# Patient Record
Sex: FEMALE | Race: WHITE | NOT HISPANIC OR LATINO | Employment: FULL TIME | URBAN - METROPOLITAN AREA
[De-identification: names, ages, dates, MRNs, and addresses within clinical notes are randomized per-mention and may not be internally consistent; named-entity substitution may affect disease eponyms.]

---

## 2017-02-10 ENCOUNTER — HISTORICAL (OUTPATIENT)
Dept: LAB | Facility: HOSPITAL | Age: 52
End: 2017-02-10

## 2020-01-21 ENCOUNTER — HISTORICAL (OUTPATIENT)
Dept: RADIOLOGY | Facility: HOSPITAL | Age: 55
End: 2020-01-21

## 2020-10-22 ENCOUNTER — HISTORICAL (OUTPATIENT)
Dept: ADMINISTRATIVE | Facility: HOSPITAL | Age: 55
End: 2020-10-22

## 2020-10-22 LAB
ABS NEUT (OLG): 2.5 X10(3)/MCL (ref 2.1–9.2)
ALBUMIN SERPL-MCNC: 4.9 GM/DL (ref 3.4–5)
ALBUMIN/GLOB SERPL: 2.04 {RATIO} (ref 1.5–2.5)
ALP SERPL-CCNC: 68 UNIT/L (ref 38–126)
ALT SERPL-CCNC: 12 UNIT/L (ref 7–52)
APPEARANCE, UA: CLEAR
AST SERPL-CCNC: 18 UNIT/L (ref 15–37)
BACTERIA #/AREA URNS AUTO: NORMAL /HPF
BILIRUB SERPL-MCNC: 1.3 MG/DL (ref 0.2–1)
BILIRUB UR QL STRIP: NEGATIVE MG/DL
BILIRUBIN DIRECT+TOT PNL SERPL-MCNC: 0.2 MG/DL (ref 0–0.5)
BILIRUBIN DIRECT+TOT PNL SERPL-MCNC: 1.1 MG/DL
BUN SERPL-MCNC: 11 MG/DL (ref 7–18)
CALCIUM SERPL-MCNC: 9.8 MG/DL (ref 8.5–10)
CHLORIDE SERPL-SCNC: 105 MMOL/L (ref 98–107)
CHOLEST SERPL-MCNC: 250 MG/DL (ref 0–200)
CHOLEST/HDLC SERPL: 4.2 {RATIO}
CK SERPL-CCNC: 63 UNIT/L (ref 21–232)
CO2 SERPL-SCNC: 27 MMOL/L (ref 21–32)
COLOR UR: YELLOW
CREAT SERPL-MCNC: 0.84 MG/DL (ref 0.6–1.3)
ERYTHROCYTE [DISTWIDTH] IN BLOOD BY AUTOMATED COUNT: 12.4 % (ref 11.5–17)
GLOBULIN SER-MCNC: 2.4 GM/DL (ref 1.2–3)
GLUCOSE (UA): NEGATIVE MG/DL
GLUCOSE SERPL-MCNC: 93 MG/DL (ref 74–106)
HCT VFR BLD AUTO: 43 % (ref 37–47)
HCV AB SERPL QL IA: NONREACTIVE
HDLC SERPL-MCNC: 59 MG/DL (ref 35–60)
HGB BLD-MCNC: 13.7 GM/DL (ref 12–16)
HGB UR QL STRIP: NEGATIVE UNIT/L
KETONES UR QL STRIP: NEGATIVE MG/DL
LDLC SERPL CALC-MCNC: 179 MG/DL (ref 0–129)
LEUKOCYTE ESTERASE UR QL STRIP: NEGATIVE UNIT/L
LYMPHOCYTES # BLD AUTO: 1.6 X10(3)/MCL (ref 0.6–3.4)
LYMPHOCYTES NFR BLD AUTO: 34.7 % (ref 13–40)
MCH RBC QN AUTO: 29.5 PG (ref 27–31.2)
MCHC RBC AUTO-ENTMCNC: 32 GM/DL (ref 32–36)
MCV RBC AUTO: 92 FL (ref 80–94)
MONOCYTES # BLD AUTO: 0.6 X10(3)/MCL (ref 0.1–1.3)
MONOCYTES NFR BLD AUTO: 13.1 % (ref 0.1–24)
NEUTROPHILS NFR BLD AUTO: 52.2 % (ref 47–80)
NITRITE UR QL STRIP.AUTO: NEGATIVE
PH UR STRIP: 5.5 [PH]
PLATELET # BLD AUTO: 184 X10(3)/MCL (ref 130–400)
PMV BLD AUTO: 10 FL (ref 9.4–12.4)
POTASSIUM SERPL-SCNC: 4.3 MMOL/L (ref 3.5–5.1)
PROT SERPL-MCNC: 7.3 GM/DL (ref 6.4–8.2)
PROT UR QL STRIP: NEGATIVE MG/DL
RBC # BLD AUTO: 4.65 X10(6)/MCL (ref 4.2–5.4)
RBC #/AREA URNS HPF: NORMAL /HPF
SODIUM SERPL-SCNC: 141 MMOL/L (ref 136–145)
SP GR UR STRIP: 1.01
SQUAMOUS EPITHELIAL, UA: NORMAL /LPF
TRIGL SERPL-MCNC: 117 MG/DL (ref 30–150)
TSH SERPL-ACNC: 1.19 MIU/ML (ref 0.35–4.94)
UROBILINOGEN UR STRIP-ACNC: 0.2 MG/DL
VLDLC SERPL CALC-MCNC: 23.4 MG/DL
WBC # SPEC AUTO: 4.7 X10(3)/MCL (ref 4.5–11.5)
WBC #/AREA URNS AUTO: NORMAL /[HPF]

## 2021-01-29 ENCOUNTER — HISTORICAL (OUTPATIENT)
Dept: ADMINISTRATIVE | Facility: HOSPITAL | Age: 56
End: 2021-01-29

## 2021-01-29 LAB
ALBUMIN SERPL-MCNC: 4.4 GM/DL (ref 3.4–5)
ALBUMIN/GLOB SERPL: 1.91 {RATIO} (ref 1.5–2.5)
ALP SERPL-CCNC: 60 UNIT/L (ref 38–126)
ALT SERPL-CCNC: 15 UNIT/L (ref 7–52)
AST SERPL-CCNC: 21 UNIT/L (ref 15–37)
BILIRUB SERPL-MCNC: 1 MG/DL (ref 0.2–1)
BILIRUBIN DIRECT+TOT PNL SERPL-MCNC: 0.2 MG/DL (ref 0–0.5)
BILIRUBIN DIRECT+TOT PNL SERPL-MCNC: 0.8 MG/DL
BUN SERPL-MCNC: 14 MG/DL (ref 7–18)
CALCIUM SERPL-MCNC: 9.4 MG/DL (ref 8.5–10)
CHLORIDE SERPL-SCNC: 108 MMOL/L (ref 98–107)
CHOLEST SERPL-MCNC: 141 MG/DL (ref 0–200)
CHOLEST/HDLC SERPL: 2.4 {RATIO}
CK SERPL-CCNC: 248 UNIT/L (ref 21–232)
CO2 SERPL-SCNC: 28 MMOL/L (ref 21–32)
CREAT SERPL-MCNC: 0.73 MG/DL (ref 0.6–1.3)
GLOBULIN SER-MCNC: 2.3 GM/DL (ref 1.2–3)
GLUCOSE SERPL-MCNC: 97 MG/DL (ref 74–106)
HDLC SERPL-MCNC: 58 MG/DL (ref 35–60)
LDLC SERPL CALC-MCNC: 63 MG/DL (ref 0–129)
POTASSIUM SERPL-SCNC: 4.4 MMOL/L (ref 3.5–5.1)
PROT SERPL-MCNC: 6.7 GM/DL (ref 6.4–8.2)
SODIUM SERPL-SCNC: 143 MMOL/L (ref 136–145)
TRIGL SERPL-MCNC: 68 MG/DL (ref 30–150)
VLDLC SERPL CALC-MCNC: 13.6 MG/DL

## 2021-02-26 ENCOUNTER — HISTORICAL (OUTPATIENT)
Dept: RADIOLOGY | Facility: HOSPITAL | Age: 56
End: 2021-02-26

## 2021-10-29 ENCOUNTER — HISTORICAL (OUTPATIENT)
Dept: ADMINISTRATIVE | Facility: HOSPITAL | Age: 56
End: 2021-10-29

## 2021-10-29 LAB
ABS NEUT (OLG): 1.8 X10(3)/MCL (ref 2.1–9.2)
ALBUMIN SERPL-MCNC: 4.6 GM/DL (ref 3.4–5)
ALBUMIN/GLOB SERPL: 1.92 {RATIO} (ref 1.5–2.5)
ALP SERPL-CCNC: 61 UNIT/L (ref 38–126)
ALT SERPL-CCNC: 11 UNIT/L (ref 7–52)
APPEARANCE, UA: CLEAR
AST SERPL-CCNC: 14 UNIT/L (ref 15–37)
BACTERIA #/AREA URNS AUTO: NORMAL /HPF
BILIRUB SERPL-MCNC: 1.2 MG/DL (ref 0.2–1)
BILIRUB UR QL STRIP: NEGATIVE MG/DL
BILIRUBIN DIRECT+TOT PNL SERPL-MCNC: 0.3 MG/DL (ref 0–0.5)
BILIRUBIN DIRECT+TOT PNL SERPL-MCNC: 0.9 MG/DL
BUN SERPL-MCNC: 13 MG/DL (ref 7–18)
CALCIUM SERPL-MCNC: 9.6 MG/DL (ref 8.5–10)
CHLORIDE SERPL-SCNC: 106 MMOL/L (ref 98–107)
CHOLEST SERPL-MCNC: 177 MG/DL (ref 0–200)
CHOLEST/HDLC SERPL: 3.1 {RATIO}
CK SERPL-CCNC: 73 UNIT/L (ref 21–232)
CO2 SERPL-SCNC: 30 MMOL/L (ref 21–32)
COLOR UR: YELLOW
CREAT SERPL-MCNC: 0.75 MG/DL (ref 0.6–1.3)
ERYTHROCYTE [DISTWIDTH] IN BLOOD BY AUTOMATED COUNT: 12.2 % (ref 11.5–17)
EST CREAT CLEARANCE SER (OHS): 100.94 ML/MIN
GLOBULIN SER-MCNC: 2.5 GM/DL (ref 1.2–3)
GLUCOSE (UA): NEGATIVE MG/DL
GLUCOSE SERPL-MCNC: 98 MG/DL (ref 74–106)
HCT VFR BLD AUTO: 39.6 % (ref 37–47)
HDLC SERPL-MCNC: 58 MG/DL (ref 35–60)
HGB BLD-MCNC: 12.9 GM/DL (ref 12–16)
HGB UR QL STRIP: NEGATIVE UNIT/L
KETONES UR QL STRIP: NEGATIVE MG/DL
LDLC SERPL CALC-MCNC: 83 MG/DL (ref 0–129)
LEUKOCYTE ESTERASE UR QL STRIP: NEGATIVE UNIT/L
LYMPHOCYTES # BLD AUTO: 1.3 X10(3)/MCL (ref 0.6–3.4)
LYMPHOCYTES NFR BLD AUTO: 37.6 % (ref 13–40)
MCH RBC QN AUTO: 29.9 PG (ref 27–31.2)
MCHC RBC AUTO-ENTMCNC: 33 GM/DL (ref 32–36)
MCV RBC AUTO: 92 FL (ref 80–94)
MONOCYTES # BLD AUTO: 0.4 X10(3)/MCL (ref 0.1–1.3)
MONOCYTES NFR BLD AUTO: 11.1 % (ref 0.1–24)
NEUTROPHILS NFR BLD AUTO: 51.3 % (ref 47–80)
NITRITE UR QL STRIP.AUTO: NEGATIVE
PH UR STRIP: 6 [PH]
PLATELET # BLD AUTO: 142 X10(3)/MCL (ref 130–400)
PMV BLD AUTO: 9.9 FL (ref 9.4–12.4)
POTASSIUM SERPL-SCNC: 4.6 MMOL/L (ref 3.5–5.1)
PROT SERPL-MCNC: 7 GM/DL (ref 6.4–8.2)
PROT UR QL STRIP: NEGATIVE MG/DL
RBC # BLD AUTO: 4.32 X10(6)/MCL (ref 4.2–5.4)
RBC #/AREA URNS HPF: NORMAL /HPF
SODIUM SERPL-SCNC: 143 MMOL/L (ref 136–145)
SP GR UR STRIP: 1.02
SQUAMOUS EPITHELIAL, UA: NORMAL /LPF
TRIGL SERPL-MCNC: 72 MG/DL (ref 30–150)
TSH SERPL-ACNC: 1.1 MIU/ML (ref 0.35–4.94)
UROBILINOGEN UR STRIP-ACNC: 0.2 MG/DL
VLDLC SERPL CALC-MCNC: 14.4 MG/DL
WBC # SPEC AUTO: 3.5 X10(3)/MCL (ref 4.5–11.5)
WBC #/AREA URNS AUTO: NORMAL /[HPF]

## 2022-03-18 ENCOUNTER — HISTORICAL (OUTPATIENT)
Dept: ADMINISTRATIVE | Facility: HOSPITAL | Age: 57
End: 2022-03-18

## 2022-04-08 ENCOUNTER — HISTORICAL (OUTPATIENT)
Dept: RADIOLOGY | Facility: HOSPITAL | Age: 57
End: 2022-04-08

## 2022-04-08 ENCOUNTER — HISTORICAL (OUTPATIENT)
Dept: ADMINISTRATIVE | Facility: HOSPITAL | Age: 57
End: 2022-04-08

## 2022-04-09 ENCOUNTER — HISTORICAL (OUTPATIENT)
Dept: ADMINISTRATIVE | Facility: HOSPITAL | Age: 57
End: 2022-04-09

## 2022-04-21 ENCOUNTER — HISTORICAL (OUTPATIENT)
Dept: RADIOLOGY | Facility: HOSPITAL | Age: 57
End: 2022-04-21

## 2022-04-21 ENCOUNTER — HISTORICAL (OUTPATIENT)
Dept: ADMINISTRATIVE | Facility: HOSPITAL | Age: 57
End: 2022-04-21

## 2022-04-26 VITALS
BODY MASS INDEX: 26.13 KG/M2 | HEIGHT: 68 IN | WEIGHT: 172.38 LBS | DIASTOLIC BLOOD PRESSURE: 72 MMHG | SYSTOLIC BLOOD PRESSURE: 132 MMHG

## 2022-04-27 DIAGNOSIS — R89.9 ABNORMAL LABORATORY TEST: Primary | ICD-10-CM

## 2022-05-02 NOTE — HISTORICAL [facility] CERNER
This is a historical note converted from [place]erm[place]. Formatting and pictures may have been removed.  Please reference Lucila for original formatting and attached multimedia. Chief Complaint  wellnes  History of Present Illness  The patient is a 56 year old white female,?here today for a complete Wellness physical.??The patient?has?no acute complaints today. The patient also carries a diagnosis of?HLD, which is assessed today.? Exercise is reported as moderate and includes?walking with no chest pain or shortness of breath.?? Diet modifications are reported as?attempting lean proteins and increased vegetable matter and intermittent fasting.?? Previous documented weight is recorded? as, charted today?as 187#- lost noted, congratulated her on her efforts and hard work, enc to keep it up! 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 Overall she is feeling well and denies any acute issue or concerns at this time. She is followed by her GYN for paps and mammogram s and has an apt in Dec  She does have a history of hearing loss in which she wears hearing aides  ?   Byron King-YASMINE Jimenez-GYN  [place]  Review of Systems  Constitutional:?no weight gain,?weight loss,?no fatigue,?no fever,?no chills,?no weakness,?no trouble sleeping.  Eyes:?no vision loss/changes,?glasses or contacts,?no pain,?no redness,?no blurry or double vision,?no flashing lights,?no specks,?no glaucoma,?no cataracts.  Last eye exam:?unknown  Head:?no headache,?no head injury,?no neck pain.?  Neck:??no lumps,?no swollen glands,?no stiffness.  Ears:?no decreased hearing,?no ringing,?no earache,?no drainage.?  Nose:?no stuffiness,?no discharge,?no itching,?no hay fever,?no nosebleeds,?no sinus pain.  Throat:?no bleeding,?no dentures,?no sore tongue,?no dry mouth,?no sore throat,?no hoarseness,?no thrush,?no non-healing sores.  Cardiovascular:?no chest pain or discomfort,?no tightness,?no palpitations,?no SOB with  activity,?no difficulty breathing while supine,?no swelling,?no sudden awakening from sleep with SOB.  Vascular:?no calf pain with walking,?no leg cramping.  Respiratory:??no cough,?no sputum,?no coughing up blood,?no SOB,?no wheezing,?no painful breathing.  Gastrointestinal:?no swallowing difficulty,?no heartburn,?no change in appetite,?no nausea,?no change in bowel habits,?no rectal bleeding,?no constipation,?no diarrhea,?no yellow eyes or skin.  Urinary:?no frequency,?no urgency,?no burning or pain,?no blood in urine,?no incontinence,?no change in urinary strength.  Musculoskeletal:?no muscle or joint pain,?no stiffness,?no back pain,?no redness of joints,?no swelling of joints,?no trauma.  Skin:?no rashes,?no lumps,?no itching,?no dryness,?color normal for ethnicity,?no hair or nail changes.  Neurologic:?no dizziness,?no fainting,?no seizures,?no weakness,?no numbness,?no tingling,?no tremors.  Psychiatric:?no nervousness,?no stress,?no depression,?no memory loss.  Endocrine:?no heat or cold intolerance,?no sweating,?no frequent urination,?no thirst,?no change in appetite.  Hematologic:?no ease of bruising,?no ease of bleeding.  ?  Physical Exam  Vitals & Measurements  BP:?132/72?  HT:?172.00?cm? WT:?78.200?kg? BMI:?26.43?  General- In NAD, A&O x 4  ?   Eye- PERRL, EOMI  ?   HENT- TM/EAC clear, Nose mucosa WNL, No D/C, No Sinus Tenderness, O/P without erythema or exudates?  ?   Neck- S, No LA, No Thyromegaly, No bruits, No JVD  ?   Respiratory- CTA, No wheezing, No crackles, No rhonchi  ?   Cardiovascular- RRR W/O MGR, Pulses equal throughout  ?   Gastrointestinal- S, NT, No HSM, NABS, No masses, No peritoneal signs?  ?   Lymphatics- WNL  ?  Musculoskeletal- No tenderness, Joints WNL, FROM, Neg SLR, No CCE  ?  Integumentary- Warm, dry, intact, No lesions/rashes/hives  ?  Neurologic- No Motor/Sensory deficits, Reflexes +2 throughout, CN II-XII intact, Neg cerebellar tests  ?  Assessment/Plan  1.?Wellness  examination?Z00.00  1.?Wellness  ?   -Health and Exercise Prescription issued and educated upon  ?   -10% weight loss goal  ?   -Lifestyle counseling >20minutes  ?   -Diet: Lean proteins and increased vegetable matter  ?   -Screening: UTD with GYN-Dr. Jimenez-we will acquire records; UTD?colonoscopy?with Dr. Byron King-we will acquire records  ?   -Vaccines: Flu/Shingrix/Covid UTD, Tdap and Prevnar today  ?   -Labs:?See below  ?  2.Comorbidities:?See below  ?  3. Referrals:?None  ?  4. RTC: 12-month wellness  Ordered:  Automated Diff, Routine collect, 10/29/21 9:14:00 CDT, Blood, Collected, Stop date 10/29/21 9:14:00 CDT, Lab Collect, Wellness examination, 10/29/21 9:14:00 CDT  CBC w/ Auto Diff, Routine collect, 10/29/21 9:14:00 CDT, Blood, Stop date 10/29/21 9:14:00 CDT, Lab Collect, Wellness examination, 10/29/21 9:14:00 CDT  Clinic Follow up, *Est. 10/29/22 3:00:00 CDT, Order for future visit, Wellness examination, [facility]  Clinic Follow-up PRN, 10/29/21 9:10:00 CDT, [facility] - [facility], Future Order  Comprehensive Metabolic Panel, Routine collect, 10/29/21 9:14:00 CDT, Blood, Stop date 10/29/21 9:14:00 CDT, Lab Collect, Wellness examination, 10/29/21 9:14:00 CDT  Lipid Panel, Routine collect, 10/29/21 9:14:00 CDT, Blood, Stop date 10/29/21 9:14:00 CDT, Lab Collect, Wellness examination  HLD (hyperlipidemia), 10/29/21 9:14:00 CDT  Preventative Health Care Est 40-64 years [id number] PC, Wellness examination, [facility] - [facility], 10/29/21 9:10:00 CDT  Thyroid Stimulating Hormone, Routine collect, 10/29/21 9:14:00 CDT, Blood, Stop date 10/29/21 9:14:00 CDT, Lab Collect, Wellness examination, 10/29/21 9:14:00 CDT  Urinalysis no Reflex, Routine collect, Urine, 10/29/21 9:14:00 CDT, Stop date 10/29/21 9:14:00 CDT, Nurse collect, Wellness examination  ?  2.?HLD (hyperlipidemia)?E78.5  1. Statin medication is?efficacious with no side effects  2. Continue medication dosing with no change-12-month prescription given  3.  Low-cholesterol diet?given and educated upon  4. FLP?[id number]-yc  Ordered:  Creatine Kinase, Routine collect, 10/29/21 9:14:00 CDT, Blood, Stop date 10/29/21 9:14:00 CDT, Lab Collect, HLD (hyperlipidemia), 10/29/21 9:14:00 CDT  Lipid Panel, Routine collect, 10/29/21 9:14:00 CDT, Blood, Stop date 10/29/21 9:14:00 CDT, Lab Collect, Wellness examination  HLD (hyperlipidemia), 10/29/21 9:14:00 CDT  ?  3.?Need for vaccination?Z23  1. Tdap and Prevnar today  ?  4.?Hearing loss?H91.90  1. Currently controlled with hearing aides  ?  Orders:  rosuvastatin, 20 mg = 1 tab(s), Oral, Daily, d/c pravastatin, # 90 tab(s), 3 Refill(s), Pharmacy: [facility], 172, cm, Height/Length Dosing, 10/29/21 8:35:00 CDT, 78.2, kg, Weight Dosing, 10/29/21 8:35:00 CDT  Referrals  Clinic Follow up, *Est. 10/29/22 3:00:00 CDT, Order for future visit, Wellness examination, [facility]  Clinic Follow-up PRN, 10/29/21 9:10:00 CDT, [facility]LINK AMB - [facility], Future Order   Problem List/Past Medical History  Ongoing  Hearing loss  Historical  No qualifying data  Procedure/Surgical History  Colonoscopy   Medications  Fish Oil 1200 mg oral capsule, 1200 mg= 1 cap(s), Oral, TID  rosuvastatin 20 mg oral tablet, 20 mg= 1 tab(s), Oral, Daily, 3 refills  Vitamin D 1000 intl units oral tablet  Xyzal 5 mg oral tablet, 5 mg= 1 tab(s), Oral, qPM  Allergies  No Known Allergies  Family History  Alzheimers disease: Mother.  Atrial fibrillation: Mother.  CAD - Coronary artery disease: Mother.  COPD (chronic obstructive pulmonary disease).: Father.  Hypertension.: Mother.  Immunizations  Vaccine Date Status   pneumococcal 13-valent conjugate vaccine 10/29/2021 Given   tetanus/diphtheria/pertussis, acel(Tdap) 10/29/2021 Given   influenza virus vaccine, inactivated 09/20/2021 Recorded   zoster vaccine, inactivated 01/29/2021 Given   COVID-19 MRNA, LNP-S, PF- Pfizer 01/12/2021 Recorded   COVID-19 MRNA, LNP-S, PF- Pfizer 12/22/2020 Recorded   zoster vaccine, inactivated  10/22/2020 Given   influenza virus vaccine, inactivated 09/25/2019 Recorded   influenza virus vaccine, inactivated 09/21/2018 Recorded   tetanus-diphth toxoids (Td) adult/adol 2011 Recorded   measles/mumps/rubella virus vaccine 07/02/2002 Recorded   rubella virus vaccine 07/26/1992 Recorded   smallpox vaccine 04/23/1971 Recorded   measles virus vaccine 03/02/1966 Recorded   diphtheria/pertussis, acel/tetanus ped 04/08/1964 Recorded   smallpox vaccine 03/18/1964 Recorded   diphtheria/pertussis, acel/tetanus ped 03/04/1964 Recorded   diphtheria/pertussis, acel/tetanus ped 02/05/1964 Recorded   Health Maintenance  Health Maintenance  ???Pending?(in the next year)  ??? ??Due?  ??? ? ? ?ADL Screening due??10/29/21??and every 1??year(s)  ??? ??Refused?  ??? ? ? ?Aspirin Therapy for CVD Prevention due??10/29/21??and every 1??year(s)  ??? ??Due In Future?  ??? ? ? ?Obesity Screening not due until??01/01/22??and every 1??year(s)  ??? ? ? ?Alcohol Misuse Screening not due until??01/02/22??and every 1??year(s)  ???Satisfied?(in the past 1 year)  ??? ??Satisfied?  ??? ? ? ?Alcohol Misuse Screening on??10/29/21.??Satisfied by Martha Yusuf NP  ??? ? ? ?Blood Pressure Screening on??10/29/21.??Satisfied by Maryann Ivy MA  ??? ? ? ?Body Mass Index Check on??10/29/21.??Satisfied by Maryann Ivy MA  ??? ? ? ?Breast Cancer Screening on??02/26/21.??Satisfied by Bismark Reid  ??? ? ? ?Depression Screening on??10/29/21.??Satisfied by Anni Arthur NP Martha  ??? ? ? ?Diabetes Screening on??01/29/21.??Satisfied by Bernard Huang  ??? ? ? ?Influenza Vaccine on??09/20/21.??Satisfied by Maryann Ivy MA  ??? ? ? ?Lipid Screening on??01/29/21.??Satisfied by Bernard Huang  ??? ? ? ?Obesity Screening on??10/29/21.??Satisfied by Maryann Ivy MA  ??? ? ? ?Tetanus Vaccine on??10/29/21.??Satisfied by Maryann Ivy MA  ??? ??Refused?  ??? ? ? ?Aspirin Therapy for CVD Prevention on??10/29/21.??Recorded by Anni Arthur  Martha BLANDON??Reason: Patient Refuses  ?      The?physician?is present within?the office with the?nurse practitioner.??The?office visit?documentation and management have been?reviewed and agreed upon.? I will continue to follow?this patient along with?the nurse practitioner?for current and future care.

## 2022-05-02 NOTE — HISTORICAL [facility] CERNER
This is a historical note converted from Lucila. Formatting and pictures may have been removed.  Please reference Lucila for original formatting and attached multimedia. Chief Complaint  cpx  History of Present Illness  The patient is a 55 year old white female,?past patient of this clinic?but not seen since 2017?due to some insurance changes, here today for a complete Wellness physical.? She lost her? to cancer?in her mother this last year. ?She is still in the grieving process.??She feels that she has a good support group and plans on?setting of group?support with hospice [facility] once it is available again. ?The patient?has?no acute complaints today. The patient also carries a diagnosis of?HLD, which is assessed today.? Exercise is reported as moderate and includes?walking with no chest pain or shortness of breath.?? Diet modifications are reported as?attempting lean proteins and increased vegetable matter and intermittent fasting.?? Previous documented weight is recorded? as, charted today?as 187?pounds.  ?   Byron King-GI  Sathish Barbara-GYN  Review of Systems  Constitutional:?no weight gain,?no weight loss,?no fatigue,?no fever,?no chills,?no weakness,?no trouble sleeping.  Eyes:?no vision loss/changes,?no glasses or contacts,?no pain,?no redness,?no blurry or double vision,?no flashing lights,?no specks,?no glaucoma,?no cataracts.  Last eye exam:?within last 6 months  Head:?no headache,?no head injury,?no neck pain.?  Neck:??no lumps,?no swollen glands,?no stiffness.  Ears:?no decreased hearing,?no ringing,?no earache,?no drainage.?  Nose:?no stuffiness,?no discharge,?no itching,?no hay fever,?no nosebleeds,?no sinus pain.  Throat:?no bleeding,?no dentures,?no sore tongue,?no dry mouth,?no sore throat,?no hoarseness,?no thrush,?no non-healing sores.  Cardiovascular:?no chest pain or discomfort,?no tightness,?no palpitations,?no SOB with activity,?no difficulty breathing while supine,?no  swelling,?no sudden awakening from sleep with SOB.  Vascular:?no calf pain with walking,?no leg cramping.  Respiratory:??no cough,?no sputum,?no coughing up blood,?no SOB,?no wheezing,?no painful breathing.  Gastrointestinal:?no swallowing difficulty,?no heartburn,?no change in appetite,?no nausea,?no change in bowel habits,?no rectal bleeding,?no constipation,?no diarrhea,?no yellow eyes or skin.  Urinary:?no frequency,?no urgency,?no burning or pain,?no blood in urine,?no incontinence,?no change in urinary strength.  Musculoskeletal:?no muscle or joint pain,?no stiffness,?no back pain,?no redness of joints,?no swelling of joints,?no trauma.  Skin:?no rashes,?no lumps,?no itching,?no dryness,?color normal for ethnicity,?no hair or nail changes.  Neurologic:?no dizziness,?no fainting,?no seizures,?no weakness,?no numbness,?no tingling,?no tremors.  Psychiatric:?no nervousness,?no stress,?no depression,?no memory loss.  Endocrine:?no heat or cold intolerance,?no sweating,?no frequent urination,?no thirst,?no change in appetite.  Hematologic:?no ease of bruising,?no ease of bleeding.  ?  ?  ?  ?  Physical Exam  Vitals & Measurements  BP:?128/84?  HT:?172.00?cm? WT:?85.100?kg? BMI:?28.77?  VITAL SIGNS:? Reviewed.? ?  GENERAL:? In?no apparent distress.? Alert and Oriented x3  HEAD:?No signsof head trauma. Normocephalic  EYES:? Pupils?equal/round/reactive.? Extraocular motionsintact.  EARS:? Hearing?grossly intact. TMs and EAC?clear  MOUTH:??Oropharynx is clear.?No erythema. No exudates  NECK:? No LAD. No JVD. No thyromegaly. No bruits  CHEST:? Chest with clear breath sounds bilaterally.? No wheezes, rales, or rhonchi. Good air movement  CARDIAC:? Regular rate and rhythm.? S1 and S2, without murmurs, gallops, or rubs.  VASCULAR:??No Edema.? Peripheral pulses normal and equal in all extremities.  ABDOMEN:?Soft, without detectable tenderness.??No sign of distention.?No rebound or guarding, and no masses palpated.? ?Bowel  Sounds present and normal x 4.  MUSCULOSKELETAL:??Good range of motion of all major joints.?5/5 strength throughout. Extremities without clubbing, cyanosis or edema.  NEUROLOGIC EXAM:? Alert and oriented x 3.? No focal sensory or strength deficits.? ?Speech normal.? Follows commands.  PSYCHIATRIC:? Mood normal.  SKIN:??No rash or lesions.  Assessment/Plan  1.?Wellness examination?Z00.00  ?Assessment/Plan:  ?   1.?Wellness  ?   -Health and Exercise Prescription issued and educated upon  ?   -10% weight loss goal  ?   -Lifestyle counseling >20minutes  ?   -Diet: Lean proteins and increased vegetable matter  ?   -Screening: UTD with GYN-Dr. Jimenez-we will acquire records; UTD?colonoscopy?with Dr. Byron King-we will acquire records  ?   -Vaccines: Shingrix No. 1 today;?otherwise UTD  ?   -Labs:?See below  ?   2.Comorbidities:?See below  ?  3. Referrals:?None  ?  4. RTC: 12-month wellness  ?  Ordered:  CBC w/ Auto Diff, Routine collect, 10/22/20 15:37:00 CDT, Blood, Order for future visit, Stop date 10/22/20 15:37:00 CDT, Lab Collect, Wellness examination, 10/22/20 15:37:00 CDT  Clinic Follow up, *Est. 10/22/21 3:00:00 CDT, Wellness, Order for future visit, Wellness examination, [facility]  Comprehensive Metabolic Panel, Routine collect, 10/22/20 15:37:00 CDT, Blood, Order for future visit, Stop date 10/22/20 15:37:00 CDT, Lab Collect, Wellness examination, 10/22/20 15:37:00 CDT  Lab Collection Request, 10/22/20 15:37:00 CDT, [facility]LINK AMB - [facility], 10/22/20 15:37:00 CDT, Wellness examination  Lipid Panel, Routine collect, *Est. 10/22/20 3:00:00 CDT, Blood, Order for future visit, *Est. Stop date 10/22/20 3:00:00 CDT, Lab Collect, Wellness examination, 10/22/20 15:37:00 CDT  Thyroid Stimulating Hormone, Routine collect, 10/22/20 15:37:00 CDT, Blood, Order for future visit, Stop date 10/22/20 15:37:00 CDT, Lab Collect, Wellness examination, 10/22/20 15:37:00 CDT  Urinalysis no Reflex, Routine collect, Urine, Order for  future visit, 10/22/20 15:37:00 CDT, Stop date 10/22/20 15:37:00 CDT, Nurse collect, Wellness examination  ?  2.?HLD (hyperlipidemia)?E78.5  ??-Statin medication is?efficacious with no side effects  -Continue medication dosing with no change-12-month prescription given  -Low-cholesterol diet?given and educated upon  -FLP?syiqi-deimfg-cb  Ordered:  Creatine Kinase, Routine collect, 10/22/20 15:37:00 CDT, Blood, Order for future visit, Stop date 10/22/20 15:37:00 CDT, Lab Collect, HLD (hyperlipidemia), 10/22/20 15:37:00 CDT  ?  Orders:  pravastatin, See Instructions, TAKE 1 TABLET BY MOUTH EVERYDAY AT BEDTIME, # 90 tab(s), 3 Refill(s), Pharmacy: [facility], 172, cm, Height/Length Dosing, 10/22/20 14:59:00 CDT, 85.1, kg, Weight Dosing, 10/22/20 14:59:00 CDT  zoster vaccine, inactivated, 0.5 mL, IM, Once-NOW, first dose 10/22/20 15:37:00 CDT, stop date 10/22/20 15:37:00 CDT  Hepatitis C Antibody, Routine collect, 10/22/20 15:37:00 CDT, Blood, Order for future visit, Stop date 10/22/20 15:37:00 CDT, Lab Collect, Encounter for hepatitis C screening test for low risk patient, 10/22/20 15:37:00 CDT  Referrals  Clinic Follow up, *Est. 10/22/21 3:00:00 CDT, Wellness, Order for future visit, Wellness examination, [facility]   Problem List/Past Medical History  Ongoing  No qualifying data  Historical  No qualifying data  Medications  pravastatin 40 mg oral tablet, See Instructions, 3 refills  Allergies  No Known Allergies  Immunizations  Vaccine Date Status   zoster vaccine, inactivated 10/22/2020 Given   influenza virus vaccine, inactivated 09/25/2019 Recorded   influenza virus vaccine, inactivated 09/21/2018 Recorded   tetanus-diphth toxoids (Td) adult/adol 2011 Recorded   measles/mumps/rubella virus vaccine 07/02/2002 Recorded   smallpox vaccine 04/23/1971 Recorded   diphtheria/pertussis, acel/tetanus ped 04/08/1964 Recorded   smallpox vaccine 03/18/1964 Recorded   diphtheria/pertussis, acel/tetanus ped 03/04/1964  Recorded   diphtheria/pertussis, acel/tetanus ped 02/05/1964 Recorded   Health Maintenance  Health Maintenance  ???Pending?(in the next year)  ??? ??OverDue  ??? ? ? ?Alcohol Misuse Screening due??01/02/20??and every 1??year(s)  ??? ??Due?  ??? ? ? ?ADL Screening due??10/22/20??and every 1??year(s)  ??? ? ? ?Aspirin Therapy for CVD Prevention due??10/22/20??and every 1??year(s)  ??? ??Due In Future?  ??? ? ? ?Obesity Screening not due until??01/01/21??and every 1??year(s)  ??? ? ? ?Tetanus Vaccine not due until??01/01/21??and every 10??year(s)  ???Satisfied?(in the past 1 year)  ??? ??Satisfied?  ??? ? ? ?Blood Pressure Screening on??10/22/20.??Satisfied by Yumiko Salazar  ??? ? ? ?Body Mass Index Check on??10/22/20.??Satisfied by Yumiko Salazar  ??? ? ? ?Breast Cancer Screening on??01/21/20.??Satisfied by Bismark Reid  ??? ? ? ?Cervical Cancer Screening on??10/22/20.??Satisfied by Malick Meng  ??? ? ? ?Obesity Screening on??10/22/20.??Satisfied by Yumiko Salazar  ?

## 2022-11-28 PROBLEM — E78.2 MIXED HYPERLIPIDEMIA: Status: ACTIVE | Noted: 2022-11-28

## 2022-11-29 PROBLEM — Z97.4 HEARING AID WORN: Status: ACTIVE | Noted: 2022-11-29

## 2023-04-28 ENCOUNTER — HOSPITAL ENCOUNTER (OUTPATIENT)
Dept: RADIOLOGY | Facility: HOSPITAL | Age: 58
Discharge: HOME | End: 2023-04-28
Attending: OBSTETRICS & GYNECOLOGY
Payer: COMMERCIAL

## 2023-04-28 DIAGNOSIS — Z12.31 ENCOUNTER FOR SCREENING MAMMOGRAM FOR BREAST CANCER: ICD-10-CM

## 2023-04-28 PROCEDURE — 77067 MAMMO DIGITAL SCREENING BILAT WITH TOMO: ICD-10-PCS | Mod: 26,,, | Performed by: RADIOLOGY

## 2023-04-28 PROCEDURE — 77063 BREAST TOMOSYNTHESIS BI: CPT | Mod: 26,,, | Performed by: RADIOLOGY

## 2023-04-28 PROCEDURE — 77067 SCR MAMMO BI INCL CAD: CPT | Mod: TC

## 2023-04-28 PROCEDURE — 77067 SCR MAMMO BI INCL CAD: CPT | Mod: 26,,, | Performed by: RADIOLOGY

## 2023-04-28 PROCEDURE — 77063 MAMMO DIGITAL SCREENING BILAT WITH TOMO: ICD-10-PCS | Mod: 26,,, | Performed by: RADIOLOGY

## 2023-06-11 ENCOUNTER — PATIENT MESSAGE (OUTPATIENT)
Dept: ADMINISTRATIVE | Facility: OTHER | Age: 58
End: 2023-06-11
Payer: COMMERCIAL

## 2023-06-12 ENCOUNTER — LAB VISIT (OUTPATIENT)
Dept: LAB | Facility: HOSPITAL | Age: 58
End: 2023-06-12
Attending: STUDENT IN AN ORGANIZED HEALTH CARE EDUCATION/TRAINING PROGRAM
Payer: COMMERCIAL

## 2023-06-12 ENCOUNTER — PATIENT MESSAGE (OUTPATIENT)
Dept: ADMINISTRATIVE | Facility: OTHER | Age: 58
End: 2023-06-12
Payer: COMMERCIAL

## 2023-06-12 DIAGNOSIS — Z01.818 OTHER SPECIFIED PRE-OPERATIVE EXAMINATION: ICD-10-CM

## 2023-06-12 DIAGNOSIS — Z01.818 PREOPERATIVE EXAMINATION, UNSPECIFIED: Primary | ICD-10-CM

## 2023-06-12 DIAGNOSIS — Z01.818 OTHER SPECIFIED PRE-OPERATIVE EXAMINATION: Primary | ICD-10-CM

## 2023-06-12 LAB
BASOPHILS # BLD AUTO: 0.03 X10(3)/MCL
BASOPHILS NFR BLD AUTO: 0.8 %
EOSINOPHIL # BLD AUTO: 0.18 X10(3)/MCL (ref 0–0.9)
EOSINOPHIL NFR BLD AUTO: 4.8 %
ERYTHROCYTE [DISTWIDTH] IN BLOOD BY AUTOMATED COUNT: 12.7 % (ref 11.5–17)
HCT VFR BLD AUTO: 39.9 % (ref 37–47)
HGB BLD-MCNC: 12.6 G/DL (ref 12–16)
IMM GRANULOCYTES # BLD AUTO: 0.01 X10(3)/MCL (ref 0–0.04)
IMM GRANULOCYTES NFR BLD AUTO: 0.3 %
LYMPHOCYTES # BLD AUTO: 1.47 X10(3)/MCL (ref 0.6–4.6)
LYMPHOCYTES NFR BLD AUTO: 39.5 %
MCH RBC QN AUTO: 29.9 PG (ref 27–31)
MCHC RBC AUTO-ENTMCNC: 31.6 G/DL (ref 33–36)
MCV RBC AUTO: 94.8 FL (ref 80–94)
MONOCYTES # BLD AUTO: 0.37 X10(3)/MCL (ref 0.1–1.3)
MONOCYTES NFR BLD AUTO: 9.9 %
NEUTROPHILS # BLD AUTO: 1.66 X10(3)/MCL (ref 2.1–9.2)
NEUTROPHILS NFR BLD AUTO: 44.7 %
NRBC BLD AUTO-RTO: 0 %
PLATELET # BLD AUTO: 136 X10(3)/MCL (ref 130–400)
PMV BLD AUTO: 11.2 FL (ref 7.4–10.4)
RBC # BLD AUTO: 4.21 X10(6)/MCL (ref 4.2–5.4)
WBC # SPEC AUTO: 3.72 X10(3)/MCL (ref 4.5–11.5)

## 2023-06-12 PROCEDURE — 85025 COMPLETE CBC W/AUTO DIFF WBC: CPT

## 2023-06-12 PROCEDURE — 93010 EKG 12-LEAD: ICD-10-PCS | Mod: ,,, | Performed by: STUDENT IN AN ORGANIZED HEALTH CARE EDUCATION/TRAINING PROGRAM

## 2023-06-12 PROCEDURE — 93010 ELECTROCARDIOGRAM REPORT: CPT | Mod: ,,, | Performed by: STUDENT IN AN ORGANIZED HEALTH CARE EDUCATION/TRAINING PROGRAM

## 2023-06-12 PROCEDURE — 36415 COLL VENOUS BLD VENIPUNCTURE: CPT

## 2023-06-12 PROCEDURE — 93005 ELECTROCARDIOGRAM TRACING: CPT

## 2023-06-12 NOTE — DISCHARGE INSTRUCTIONS
Patient Education     Loop Electrosurgical Excision Procedure Discharge Instructions     About this topic   Dysplasia is one more name for cells that are not normal. Cervical dysplasia means the cells on the top of your cervix are not normal. The cervix is a part of your uterus that opens at the top of vagina. This abnormal change is not cancer but may lead to cancer if not treated.  A loop electrosurgical excision procedure is an operation that uses a tool that looks like a wire loop. The procedure is sometimes called a LEEP procedure. This tool uses electrical current to scoop out damaged tissues. This procedure is done to remove abnormal cells inside the cervix.    What care is needed at home?   You may have a watery discharge for a few weeks after this procedure. Your discharge may also be brown or black. This is normal. Wear sanitary pads. Do not use tampons or douches  Your doctor may order drugs to relieve pain for a day or two.  You may shower as usual.  No tub baths, hot tubs, or swimming.  Ask your doctor when it is OK to begin having sex.    What follow-up care is needed?   Be sure to keep your follow-up visit.  You may need another Pap test. This will tell how often you need to see your doctor or have more treatments.  Your doctor may send you to a fertility specialist if you plan to have children.    Will physical activity be limited?   Rest for the first few days after the procedure. Avoid strenuous activities like heavy lifting and hard exercise.    What problems could happen?   Bleeding  Infection  Injury to nearby structures    What can be done to prevent this health problem?   Stop smoking. It increases the risk of cancer of the cervix.  Get regular Pap tests.  Practice safe sex. Use a condom to prevent sexually-transmitted infections.    When do I need to call the doctor?   Signs of infection. These include a fever of 100.4°F (38°C) or higher, chills.  Burning or stinging when you pass  urine  Heavy or prolonged bleeding or passing clots  Pain in the lower belly not relieved by drugs

## 2023-06-13 ENCOUNTER — PATIENT MESSAGE (OUTPATIENT)
Dept: ADMINISTRATIVE | Facility: OTHER | Age: 58
End: 2023-06-13
Payer: COMMERCIAL

## 2023-06-13 ENCOUNTER — ANESTHESIA EVENT (OUTPATIENT)
Dept: SURGERY | Facility: HOSPITAL | Age: 58
End: 2023-06-13
Payer: COMMERCIAL

## 2023-06-14 ENCOUNTER — HOSPITAL ENCOUNTER (OUTPATIENT)
Facility: HOSPITAL | Age: 58
Discharge: HOME | End: 2023-06-14
Attending: OBSTETRICS & GYNECOLOGY | Admitting: OBSTETRICS & GYNECOLOGY
Payer: COMMERCIAL

## 2023-06-14 ENCOUNTER — ANESTHESIA (OUTPATIENT)
Dept: SURGERY | Facility: HOSPITAL | Age: 58
End: 2023-06-14
Payer: COMMERCIAL

## 2023-06-14 DIAGNOSIS — R87.610 PAPANICOLAOU SMEAR OF CERVIX WITH ATYPICAL SQUAMOUS CELLS OF UNDETERMINED SIGNIFICANCE (ASC-US): ICD-10-CM

## 2023-06-14 DIAGNOSIS — Z01.818 PREOPERATIVE EXAMINATION, UNSPECIFIED: Primary | ICD-10-CM

## 2023-06-14 PROCEDURE — D9220A PRA ANESTHESIA: Mod: ANES,,, | Performed by: ANESTHESIOLOGY

## 2023-06-14 PROCEDURE — 36000706: Performed by: OBSTETRICS & GYNECOLOGY

## 2023-06-14 PROCEDURE — D9220A PRA ANESTHESIA: ICD-10-PCS | Mod: ANES,,, | Performed by: ANESTHESIOLOGY

## 2023-06-14 PROCEDURE — 71000033 HC RECOVERY, INTIAL HOUR: Performed by: OBSTETRICS & GYNECOLOGY

## 2023-06-14 PROCEDURE — 36000707: Performed by: OBSTETRICS & GYNECOLOGY

## 2023-06-14 PROCEDURE — D9220A PRA ANESTHESIA: ICD-10-PCS | Mod: CRNA,,,

## 2023-06-14 PROCEDURE — 63600175 PHARM REV CODE 636 W HCPCS

## 2023-06-14 PROCEDURE — 37000008 HC ANESTHESIA 1ST 15 MINUTES: Performed by: OBSTETRICS & GYNECOLOGY

## 2023-06-14 PROCEDURE — 37000009 HC ANESTHESIA EA ADD 15 MINS: Performed by: OBSTETRICS & GYNECOLOGY

## 2023-06-14 PROCEDURE — 71000015 HC POSTOP RECOV 1ST HR: Performed by: OBSTETRICS & GYNECOLOGY

## 2023-06-14 PROCEDURE — 63600175 PHARM REV CODE 636 W HCPCS: Performed by: ANESTHESIOLOGY

## 2023-06-14 PROCEDURE — 25000003 PHARM REV CODE 250

## 2023-06-14 PROCEDURE — D9220A PRA ANESTHESIA: Mod: CRNA,,,

## 2023-06-14 PROCEDURE — 25000003 PHARM REV CODE 250: Performed by: ANESTHESIOLOGY

## 2023-06-14 RX ORDER — ONDANSETRON 2 MG/ML
4 INJECTION INTRAMUSCULAR; INTRAVENOUS DAILY PRN
Status: DISCONTINUED | OUTPATIENT
Start: 2023-06-14 | End: 2023-06-14 | Stop reason: HOSPADM

## 2023-06-14 RX ORDER — SILVER NITRATE 38.21; 12.74 MG/1; MG/1
STICK TOPICAL
Status: DISCONTINUED
Start: 2023-06-14 | End: 2023-06-14 | Stop reason: WASHOUT

## 2023-06-14 RX ORDER — DIPHENHYDRAMINE HCL 25 MG
25 CAPSULE ORAL EVERY 4 HOURS PRN
Status: DISCONTINUED | OUTPATIENT
Start: 2023-06-14 | End: 2023-06-14 | Stop reason: HOSPADM

## 2023-06-14 RX ORDER — HYDROMORPHONE HYDROCHLORIDE 2 MG/ML
INJECTION, SOLUTION INTRAMUSCULAR; INTRAVENOUS; SUBCUTANEOUS
Status: DISCONTINUED | OUTPATIENT
Start: 2023-06-14 | End: 2023-06-14

## 2023-06-14 RX ORDER — HYDROMORPHONE HYDROCHLORIDE 2 MG/ML
0.2 INJECTION, SOLUTION INTRAMUSCULAR; INTRAVENOUS; SUBCUTANEOUS EVERY 5 MIN PRN
Status: DISCONTINUED | OUTPATIENT
Start: 2023-06-14 | End: 2023-06-14 | Stop reason: HOSPADM

## 2023-06-14 RX ORDER — ACETAMINOPHEN 500 MG
1000 TABLET ORAL
Status: COMPLETED | OUTPATIENT
Start: 2023-06-14 | End: 2023-06-14

## 2023-06-14 RX ORDER — HYDROMORPHONE HYDROCHLORIDE 2 MG/ML
0.2 INJECTION, SOLUTION INTRAMUSCULAR; INTRAVENOUS; SUBCUTANEOUS EVERY 5 MIN PRN
Status: DISCONTINUED | OUTPATIENT
Start: 2023-06-14 | End: 2023-06-14

## 2023-06-14 RX ORDER — MEPERIDINE HYDROCHLORIDE 25 MG/ML
12.5 INJECTION INTRAMUSCULAR; INTRAVENOUS; SUBCUTANEOUS EVERY 10 MIN PRN
Status: DISCONTINUED | OUTPATIENT
Start: 2023-06-14 | End: 2023-06-14 | Stop reason: HOSPADM

## 2023-06-14 RX ORDER — ONDANSETRON HYDROCHLORIDE 2 MG/ML
INJECTION, SOLUTION INTRAMUSCULAR; INTRAVENOUS
Status: DISCONTINUED | OUTPATIENT
Start: 2023-06-14 | End: 2023-06-14

## 2023-06-14 RX ORDER — PROPOFOL 10 MG/ML
VIAL (ML) INTRAVENOUS
Status: DISCONTINUED | OUTPATIENT
Start: 2023-06-14 | End: 2023-06-14

## 2023-06-14 RX ORDER — HYDROMORPHONE HYDROCHLORIDE 2 MG/ML
0.4 INJECTION, SOLUTION INTRAMUSCULAR; INTRAVENOUS; SUBCUTANEOUS EVERY 5 MIN PRN
Status: DISCONTINUED | OUTPATIENT
Start: 2023-06-14 | End: 2023-06-14 | Stop reason: HOSPADM

## 2023-06-14 RX ORDER — MIDAZOLAM HYDROCHLORIDE 1 MG/ML
2 INJECTION INTRAMUSCULAR; INTRAVENOUS
Status: DISPENSED | OUTPATIENT
Start: 2023-06-14 | End: 2023-06-14

## 2023-06-14 RX ORDER — SCOLOPAMINE TRANSDERMAL SYSTEM 1 MG/1
1 PATCH, EXTENDED RELEASE TRANSDERMAL
Status: DISCONTINUED | OUTPATIENT
Start: 2023-06-14 | End: 2023-06-14 | Stop reason: HOSPADM

## 2023-06-14 RX ORDER — METHOCARBAMOL 100 MG/ML
1000 INJECTION, SOLUTION INTRAMUSCULAR; INTRAVENOUS ONCE
Status: COMPLETED | OUTPATIENT
Start: 2023-06-14 | End: 2023-06-14

## 2023-06-14 RX ORDER — DIPHENHYDRAMINE HYDROCHLORIDE 50 MG/ML
25 INJECTION INTRAMUSCULAR; INTRAVENOUS EVERY 4 HOURS PRN
Status: DISCONTINUED | OUTPATIENT
Start: 2023-06-14 | End: 2023-06-14 | Stop reason: HOSPADM

## 2023-06-14 RX ORDER — SODIUM CHLORIDE, SODIUM GLUCONATE, SODIUM ACETATE, POTASSIUM CHLORIDE AND MAGNESIUM CHLORIDE 30; 37; 368; 526; 502 MG/100ML; MG/100ML; MG/100ML; MG/100ML; MG/100ML
INJECTION, SOLUTION INTRAVENOUS CONTINUOUS
Status: DISCONTINUED | OUTPATIENT
Start: 2023-06-14 | End: 2023-06-14 | Stop reason: HOSPADM

## 2023-06-14 RX ORDER — PROCHLORPERAZINE EDISYLATE 5 MG/ML
5 INJECTION INTRAMUSCULAR; INTRAVENOUS EVERY 6 HOURS PRN
Status: DISCONTINUED | OUTPATIENT
Start: 2023-06-14 | End: 2023-06-14

## 2023-06-14 RX ORDER — LIDOCAINE HYDROCHLORIDE 10 MG/ML
1 INJECTION, SOLUTION EPIDURAL; INFILTRATION; INTRACAUDAL; PERINEURAL ONCE
Status: DISCONTINUED | OUTPATIENT
Start: 2023-06-14 | End: 2023-06-14 | Stop reason: HOSPADM

## 2023-06-14 RX ORDER — DEXAMETHASONE SODIUM PHOSPHATE 4 MG/ML
INJECTION, SOLUTION INTRA-ARTICULAR; INTRALESIONAL; INTRAMUSCULAR; INTRAVENOUS; SOFT TISSUE
Status: DISCONTINUED | OUTPATIENT
Start: 2023-06-14 | End: 2023-06-14

## 2023-06-14 RX ORDER — GLYCOPYRROLATE 0.2 MG/ML
INJECTION INTRAMUSCULAR; INTRAVENOUS
Status: DISCONTINUED | OUTPATIENT
Start: 2023-06-14 | End: 2023-06-14

## 2023-06-14 RX ORDER — PROCHLORPERAZINE EDISYLATE 5 MG/ML
5 INJECTION INTRAMUSCULAR; INTRAVENOUS EVERY 30 MIN PRN
Status: DISCONTINUED | OUTPATIENT
Start: 2023-06-14 | End: 2023-06-14 | Stop reason: HOSPADM

## 2023-06-14 RX ORDER — HYDROCODONE BITARTRATE AND ACETAMINOPHEN 5; 325 MG/1; MG/1
1 TABLET ORAL EVERY 4 HOURS PRN
Status: DISCONTINUED | OUTPATIENT
Start: 2023-06-14 | End: 2023-06-14 | Stop reason: HOSPADM

## 2023-06-14 RX ORDER — DIPHENHYDRAMINE HYDROCHLORIDE 50 MG/ML
25 INJECTION INTRAMUSCULAR; INTRAVENOUS EVERY 6 HOURS PRN
Status: DISCONTINUED | OUTPATIENT
Start: 2023-06-14 | End: 2023-06-14 | Stop reason: HOSPADM

## 2023-06-14 RX ORDER — LIDOCAINE HYDROCHLORIDE 20 MG/ML
INJECTION, SOLUTION EPIDURAL; INFILTRATION; INTRACAUDAL; PERINEURAL
Status: DISCONTINUED | OUTPATIENT
Start: 2023-06-14 | End: 2023-06-14

## 2023-06-14 RX ORDER — ONDANSETRON 4 MG/1
8 TABLET, ORALLY DISINTEGRATING ORAL EVERY 8 HOURS PRN
Status: DISCONTINUED | OUTPATIENT
Start: 2023-06-14 | End: 2023-06-14 | Stop reason: HOSPADM

## 2023-06-14 RX ADMIN — GLYCOPYRROLATE 0.1 MG: 0.2 INJECTION INTRAMUSCULAR; INTRAVENOUS at 01:06

## 2023-06-14 RX ADMIN — PROPOFOL 180 MG: 10 INJECTION, EMULSION INTRAVENOUS at 01:06

## 2023-06-14 RX ADMIN — MIDAZOLAM HYDROCHLORIDE 2 MG: 1 INJECTION, SOLUTION INTRAMUSCULAR; INTRAVENOUS at 10:06

## 2023-06-14 RX ADMIN — METHOCARBAMOL 1000 MG: 100 INJECTION INTRAMUSCULAR; INTRAVENOUS at 02:06

## 2023-06-14 RX ADMIN — ACETAMINOPHEN 1000 MG: 500 TABLET, FILM COATED ORAL at 10:06

## 2023-06-14 RX ADMIN — LIDOCAINE HYDROCHLORIDE 50 MG: 20 INJECTION, SOLUTION INTRAVENOUS at 01:06

## 2023-06-14 RX ADMIN — PROPOFOL 20 MG: 10 INJECTION, EMULSION INTRAVENOUS at 01:06

## 2023-06-14 RX ADMIN — HYDROMORPHONE HYDROCHLORIDE 0.4 MG: 2 INJECTION, SOLUTION INTRAMUSCULAR; INTRAVENOUS; SUBCUTANEOUS at 01:06

## 2023-06-14 RX ADMIN — DEXAMETHASONE SODIUM PHOSPHATE 4 MG: 4 INJECTION, SOLUTION INTRA-ARTICULAR; INTRALESIONAL; INTRAMUSCULAR; INTRAVENOUS; SOFT TISSUE at 01:06

## 2023-06-14 RX ADMIN — ONDANSETRON 4 MG: 2 INJECTION INTRAMUSCULAR; INTRAVENOUS at 01:06

## 2023-06-14 RX ADMIN — SCOPOLAMINE 1 PATCH: 1 PATCH TRANSDERMAL at 10:06

## 2023-06-14 NOTE — ANESTHESIA PROCEDURE NOTES
Intubation    Date/Time: 6/14/2023 1:15 PM  Performed by: Medhat Pham CRNA  Authorized by: Hamzah Allison MD     Intubation:     Induction:  Intravenous    Intubated:  Postinduction    Mask Ventilation:  Easy mask    Attempts:  1    Attempted By:  CRNA    Difficult Airway Encountered?: No      Complications:  None    Airway Device:  Supraglottic airway/LMA    Airway Device Size:  4.0    Style/Cuff Inflation:  Cuffed (inflated to minimal occlusive pressure)    Inflation Amount (mL):  18    Placement Verified By:  Capnometry    Complicating Factors:  None    Findings Post-Intubation:  BS equal bilateral

## 2023-06-14 NOTE — OP NOTE
[facility]                       [address] [place] [address]    PATIENT NAME:      CHRISTIANO BOWER   YOB: 1965  CSN:               [id number]  MRN:               [id number]  ADMIT DATE:        06/14/2023 09:40:00  PHYSICIAN:         Sathish Jimenez MD                          OPERATIVE REPORT      DATE OF SURGERY:    06/14/2023 00:54:30    SURGEON:  Sathish Jimenez MD    OPERATION PERFORMED:  Conization of the cervix.    PREOPERATIVE DIAGNOSIS:  The patient is a 58-year-old white female with   dysplastic changes of the cervix.    POSTOPERATIVE DIAGNOSIS:  The patient is a 58-year-old white female with   dysplastic changes of the cervix.    PROCEDURE IN DETAIL:  The proper consents were obtained.  The patient brought to   the operating room, placed in supine position, underwent adequate anesthesia,   placed in dorsal lithotomy position, and prepped and draped in a sterile   fashion.  A weighted speculum was placed in the vaginal vault and anterior lip   of the cervix was grasped with single-tooth tenaculum.  The cervix was noted to   be markedly stenotic, but no lesions were noted.  At this point in time, using   the LEEP apparatus, the cone specimen was accomplished without difficulty.  All   bleeding was controlled with the Bovie.  There was no evidence of any vaginal or   vulvar dysplastic changes.  The patient was carefully brought out of dorsal   lithotomy position and brought to the recovery room in stable fashion,   tolerating the procedure well.        ______________________________  Sathish Jimenez MD    CEP/AQS  DD:  06/14/2023  Time:  01:53PM  DT:  06/14/2023  Time:  02:26PM  Job #:  [id number]      OPERATIVE REPORT

## 2023-06-14 NOTE — TRANSFER OF CARE
"Anesthesia Transfer of Care Note    Patient: Una Vo    Procedure(s) Performed: Procedure(s) (LRB):  LEEP (LOOP ELECTROSURGICAL EXCISION PROCEDURE) Conization of Cervix (N/A)    Patient location: PACU    Anesthesia Type: general    Transport from OR: Transported from OR on room air with adequate spontaneous ventilation    Post pain: adequate analgesia    Post assessment: no apparent anesthetic complications and tolerated procedure well    Post vital signs: stable    Level of consciousness: sedated and responds to stimulation    Nausea/Vomiting: no nausea/vomiting    Complications: none    Transfer of care protocol was followed      Last vitals:   Visit Vitals  BP (!) 157/79   Pulse 71   Temp 36.8 °C (98.2 °F) (Oral)   Ht 5' 8" (1.727 m)   Wt 84.2 kg (185 lb 10 oz)   SpO2 98%   Breastfeeding No   BMI 28.22 kg/m²     "

## 2023-06-14 NOTE — PLAN OF CARE
VSS, michela score  10, pt arousable and ready for transfer to [facility] per Dr Allison.

## 2023-06-14 NOTE — ANESTHESIA POSTPROCEDURE EVALUATION
Anesthesia Post Evaluation    Patient: Una Vo    Procedure(s) Performed: Procedure(s) (LRB):  LEEP (LOOP ELECTROSURGICAL EXCISION PROCEDURE) Conization of Cervix (N/A)    Final Anesthesia Type: general      Patient location during evaluation: PACU  Patient participation: Yes- Able to Participate  Level of consciousness: awake and alert and oriented  Post-procedure vital signs: reviewed and stable  Pain management: adequate  Airway patency: patent    PONV status at discharge: No PONV  Anesthetic complications: no      Cardiovascular status: hemodynamically stable  Respiratory status: unassisted  Hydration status: euvolemic  Follow-up not needed.          Vitals Value Taken Time   /80 06/14/23 1424   Temp 36.2 °C (97.2 °F) 06/14/23 1353   Pulse 67 06/14/23 1424   Resp 14 06/14/23 1424   SpO2 100 % 06/14/23 1423   Vitals shown include unvalidated device data.      Event Time   Out of Recovery 14:29:00         Pain/Hosea Score: Pain Rating Prior to Med Admin: 0 (6/14/2023 10:15 AM)  Hosea Score: 10 (6/14/2023  2:10 PM)

## 2023-06-14 NOTE — ANESTHESIA PREPROCEDURE EVALUATION
06/13/2023  Una Vo is a 58 y.o., female.    Pre-op Diagnosis: Papanicolaou smear of cervix with atypical squamous cells of undetermined significance (ASC-US) [R87.610]    Procedure(s):  LEEP (LOOP ELECTROSURGICAL EXCISION PROCEDURE) Conization of Cervix     Review of patient's allergies indicates:  No Known Allergies    Current Outpatient Medications   Medication Instructions    acetaminophen (TYLENOL) 650 mg, Oral, Every 8 hours    levocetirizine (XYZAL) 5 mg, Oral, Nightly    omega-3 fatty acids/fish oil (FISH OIL-OMEGA-3 FATTY ACIDS) 300-1,000 mg capsule 1,000 capsules, Oral, 2 times daily    rosuvastatin (CRESTOR) 20 mg, Oral, Daily    vitamin D (VITAMIN D3) 2,000 Units, Oral, Daily       MA CONIZATION CERVIX,LOOP ELECTRD [69717] (LEEP (LOOP ELECT*    Past Medical History:   Diagnosis Date    Mixed hyperlipidemia 11/28/2022    Papanicolaou smear of cervix with atypical squamous cells of undetermined significance (ASC-US)     Unspecified sensorineural hearing loss    PMH includes Possible BANDAR (no sleep study)    History reviewed. No pertinent surgical history.     Lab Results   Component Value Date    WBC 3.72 (L) 06/12/2023    HGB 12.6 06/12/2023    HCT 39.9 06/12/2023    MCV 94.8 (H) 06/12/2023     06/12/2023   BMP  Lab Results   Component Value Date     11/29/2022    K 4.4 11/29/2022    CO2 31 11/29/2022    BUN 11.0 11/29/2022    CREATININE 0.73 11/29/2022    CALCIUM 9.7 11/29/2022    EGFRNONAA >60 10/29/2021            Pre-op Assessment    I have reviewed the Patient Summary Reports.    I have reviewed the NPO Status.   I have reviewed the Medications.     Review of Systems  Anesthesia Hx:  No problems with previous Anesthesia  Denies Family Hx of Anesthesia complications.   Denies Personal Hx of Anesthesia complications.   Social:  Non-Smoker    Cardiovascular:   Exercise  tolerance: good  Denies Angina.  Denies Orthopnea.  Denies PND. hyperlipidemia  Denies WORKMAN.  Functional Capacity good / => 4 METS    Musculoskeletal:  Musculoskeletal Normal    Neurological:   Denies TIA. Denies CVA.    Psych:  Psychiatric Normal           Physical Exam  General: Well nourished, Alert and Oriented    Airway:  Mallampati: III   Mouth Opening: Normal  TM Distance: Normal  Tongue: Normal  Neck ROM: Normal ROM    Dental:  Intact    Chest/Lungs:  Clear to auscultation    Heart:  Rate: Normal  Rhythm: Regular Rhythm  No pretibial edema  No carotid bruits      Anesthesia Plan  Type of Anesthesia, risks & benefits discussed:    Anesthesia Type: Gen Supraglottic Airway  Intra-op Monitoring Plan: Standard ASA Monitors  Post Op Pain Control Plan: multimodal analgesia  Induction:  IV  Airway Plan: Direct, Post-Induction  Informed Consent: Informed consent signed with the Patient and all parties understand the risks and agree with anesthesia plan.  All questions answered. Patient consented to blood products? No  ASA Score: 3  Day of Surgery Review of History & Physical: H&P Update referred to the surgeon/provider.    Ready For Surgery From Anesthesia Perspective.     .

## 2023-06-15 VITALS
HEART RATE: 56 BPM | TEMPERATURE: 97 F | SYSTOLIC BLOOD PRESSURE: 137 MMHG | BODY MASS INDEX: 28.13 KG/M2 | DIASTOLIC BLOOD PRESSURE: 69 MMHG | WEIGHT: 185.63 LBS | OXYGEN SATURATION: 100 % | RESPIRATION RATE: 16 BRPM | HEIGHT: 68 IN

## 2023-06-15 LAB — PSYCHE PATHOLOGY RESULT: NORMAL

## 2023-06-15 NOTE — DISCHARGE SUMMARY
[place] [place] [facility] [facility]  Colorectal Surgery  Discharge Summary      Patient Name: Una Vo  MRN: [id number]  Admission Date: 6/14/2023  Hospital Length of Stay: 0 days  Discharge Date and Time:  06/15/2023 12:41 PM  Attending Physician: No att. providers found   Discharging Provider: Sathish Jimenez MD  Primary Care Provider: Alonso Mathews MD     HPI:  No notes on file    Procedure(s) (LRB):  LEEP (LOOP ELECTROSURGICAL EXCISION PROCEDURE) Conization of Cervix (N/A)     Hospital Course:  No notes on file    Goals of Care Treatment Preferences:             Significant Diagnostic Studies: N/A    Pending Diagnostic Studies:     Procedure Component Value Units Date/Time    Specimen to Pathology [[id number]] Collected: 06/14/23 1340    Order Status: Sent Lab Status: In process Updated: 06/14/23 1616    Specimen: Tissue from Cervix         There are no hospital problems to display for this patient.     Discharged Condition: good    Disposition: Home or Self Care    Follow Up:   Follow-up Information     Sathish Jimenez MD. Go on 6/29/2023.    Specialty: Obstetrics and Gynecology  Why: YOUR FOLLOW-UP APPT IS AT 11:15 AM  Contact information:  [facility] [address]  [facility] [address]  [phone]                       Patient Instructions:      EKG 12-lead   Standing Status: Future Standing Exp. Date: 06/12/24     Medications:  Reconciled Home Medications:      Medication List      ASK your doctor about these medications    acetaminophen 650 MG Tbsr  Commonly known as: TYLENOL  Take 650 mg by mouth every 8 (eight) hours.     fish oil-omega-3 fatty acids 300-1,000 mg capsule  Take 1,000 capsules by mouth 2 (two) times a day.     levocetirizine 5 MG tablet  Commonly known as: XYZAL  Take 5 mg by mouth every evening.     rosuvastatin 20 MG tablet  Commonly known as: CRESTOR  Take 1 tablet (20 mg total) by mouth once daily.     vitamin D 1000 units Tab  Commonly known as: VITAMIN D3  Take  2,000 Units by mouth once daily.            Sathish Jimenez MD  Colorectal Surgery  [facility] Periop [facility]

## 2023-12-05 PROCEDURE — 87086 URINE CULTURE/COLONY COUNT: CPT | Performed by: FAMILY MEDICINE

## 2024-05-24 ENCOUNTER — HOSPITAL ENCOUNTER (OUTPATIENT)
Dept: RADIOLOGY | Facility: HOSPITAL | Age: 59
Discharge: HOME | End: 2024-05-24
Attending: OBSTETRICS & GYNECOLOGY
Payer: COMMERCIAL

## 2024-05-24 DIAGNOSIS — Z12.31 ENCOUNTER FOR SCREENING MAMMOGRAM FOR BREAST CANCER: ICD-10-CM

## 2024-05-24 PROCEDURE — 77063 BREAST TOMOSYNTHESIS BI: CPT | Mod: 26,,, | Performed by: RADIOLOGY

## 2024-05-24 PROCEDURE — 77067 SCR MAMMO BI INCL CAD: CPT | Mod: 26,,, | Performed by: RADIOLOGY

## 2024-05-24 PROCEDURE — 77067 SCR MAMMO BI INCL CAD: CPT | Mod: TC

## 2024-05-24 PROCEDURE — 77063 BREAST TOMOSYNTHESIS BI: CPT | Mod: TC

## 2025-06-05 ENCOUNTER — HOSPITAL ENCOUNTER (OUTPATIENT)
Dept: RADIOLOGY | Facility: HOSPITAL | Age: 60
Discharge: HOME | End: 2025-06-05
Attending: OBSTETRICS & GYNECOLOGY
Payer: COMMERCIAL

## 2025-06-05 DIAGNOSIS — Z12.31 ENCOUNTER FOR SCREENING MAMMOGRAM FOR BREAST CANCER: ICD-10-CM

## 2025-06-05 PROCEDURE — 77063 BREAST TOMOSYNTHESIS BI: CPT | Mod: TC

## 2025-06-05 PROCEDURE — 77067 SCR MAMMO BI INCL CAD: CPT | Mod: 26,,, | Performed by: RADIOLOGY

## 2025-06-05 PROCEDURE — 77063 BREAST TOMOSYNTHESIS BI: CPT | Mod: 26,,, | Performed by: RADIOLOGY

## (undated) DEVICE — HANDLE DEVON RIGID OR LIGHT

## (undated) DEVICE — ELECTRODE BALL RED 5MM

## (undated) DEVICE — SOL NACL IRR 1000ML BTL

## (undated) DEVICE — ELECTRODE LOOP 20MMX12MM

## (undated) DEVICE — SEE MEDLINE ITEM 154981

## (undated) DEVICE — BLADE SURG STAINLESS STEEL #11

## (undated) DEVICE — TUBING SILICON CLR 3/16IN 10FT

## (undated) DEVICE — NDL SYR 10ML 18X1.5 LL BLUNT

## (undated) DEVICE — BOWL UTILITY BLUE 32OZ

## (undated) DEVICE — DRAPE UNDER BUTTOCKS SUC PORT

## (undated) DEVICE — Device

## (undated) DEVICE — PAD CURITY MATERNITY PERI

## (undated) DEVICE — TIP SUCTION YANKAUER

## (undated) DEVICE — CAUTERY TIP POLISHER

## (undated) DEVICE — ELECTRODE PATIENT RETURN DISP

## (undated) DEVICE — SUPPORT ULNA NERVE PROTECTOR

## (undated) DEVICE — GLOVE PROTEXIS BLUE LATEX 7

## (undated) DEVICE — GLOVE PROTEXIS PI SYN SURG 6.5

## (undated) DEVICE — TOWEL OR DISP STRL BLUE 4/PK

## (undated) DEVICE — GLOVE PROTEXIS LTX MICRO  7.5

## (undated) DEVICE — PAD PREP CUFFED NS 24X48IN

## (undated) DEVICE — TUBE SUCTION MEDI-VAC STERILE

## (undated) DEVICE — COVER PROXIMA MAYO STAND

## (undated) DEVICE — DRESSING TELFA N ADH 3X8